# Patient Record
Sex: MALE | Race: WHITE | NOT HISPANIC OR LATINO | Employment: OTHER | URBAN - NONMETROPOLITAN AREA
[De-identification: names, ages, dates, MRNs, and addresses within clinical notes are randomized per-mention and may not be internally consistent; named-entity substitution may affect disease eponyms.]

---

## 2023-11-14 ENCOUNTER — OFFICE VISIT (OUTPATIENT)
Dept: FAMILY MEDICINE CLINIC | Facility: CLINIC | Age: 63
End: 2023-11-14
Payer: COMMERCIAL

## 2023-11-14 VITALS
OXYGEN SATURATION: 98 % | HEART RATE: 55 BPM | WEIGHT: 157.4 LBS | BODY MASS INDEX: 23.31 KG/M2 | HEIGHT: 69 IN | SYSTOLIC BLOOD PRESSURE: 120 MMHG | DIASTOLIC BLOOD PRESSURE: 82 MMHG

## 2023-11-14 DIAGNOSIS — Z13.29 SCREENING FOR THYROID DISORDER: ICD-10-CM

## 2023-11-14 DIAGNOSIS — I10 PRIMARY HYPERTENSION: ICD-10-CM

## 2023-11-14 DIAGNOSIS — Z11.59 NEED FOR HEPATITIS C SCREENING TEST: ICD-10-CM

## 2023-11-14 DIAGNOSIS — F33.9 RECURRENT MAJOR DEPRESSIVE DISORDER, REMISSION STATUS UNSPECIFIED: ICD-10-CM

## 2023-11-14 DIAGNOSIS — N18.2 CHRONIC KIDNEY DISEASE (CKD) STAGE G2/A1, MILDLY DECREASED GLOMERULAR FILTRATION RATE (GFR) BETWEEN 60-89 ML/MIN/1.73 SQUARE METER AND ALBUMINURIA CREATININE RATIO LESS THAN 30 MG/G: ICD-10-CM

## 2023-11-14 DIAGNOSIS — Z13.1 SCREENING FOR DIABETES MELLITUS: ICD-10-CM

## 2023-11-14 DIAGNOSIS — E78.2 MIXED HYPERLIPIDEMIA: Primary | ICD-10-CM

## 2023-11-14 DIAGNOSIS — F41.9 ANXIETY: ICD-10-CM

## 2023-11-14 DIAGNOSIS — Z12.5 PROSTATE CANCER SCREENING: ICD-10-CM

## 2023-11-14 PROBLEM — F32.A DEPRESSION: Status: ACTIVE | Noted: 2023-11-14

## 2023-11-14 PROBLEM — E78.5 HYPERLIPIDEMIA: Status: ACTIVE | Noted: 2023-11-14

## 2023-11-14 PROBLEM — M10.9 GOUT: Status: ACTIVE | Noted: 2023-11-14

## 2023-11-14 PROBLEM — I48.0 PAROXYSMAL ATRIAL FIBRILLATION: Status: ACTIVE | Noted: 2023-11-14

## 2023-11-14 PROBLEM — K21.9 GERD (GASTROESOPHAGEAL REFLUX DISEASE): Status: ACTIVE | Noted: 2023-11-14

## 2023-11-14 PROCEDURE — 36415 COLL VENOUS BLD VENIPUNCTURE: CPT | Performed by: PHYSICIAN ASSISTANT

## 2023-11-14 RX ORDER — ATORVASTATIN CALCIUM 40 MG/1
40 TABLET, FILM COATED ORAL DAILY
COMMUNITY
Start: 2023-10-16

## 2023-11-14 RX ORDER — ISOSORBIDE MONONITRATE 30 MG/1
30 TABLET, EXTENDED RELEASE ORAL EVERY MORNING
COMMUNITY
Start: 2023-08-24

## 2023-11-14 RX ORDER — RANOLAZINE 500 MG/1
500 TABLET, EXTENDED RELEASE ORAL 2 TIMES DAILY
COMMUNITY

## 2023-11-14 RX ORDER — LOSARTAN POTASSIUM 25 MG/1
25 TABLET ORAL DAILY
COMMUNITY
Start: 2023-11-07

## 2023-11-14 RX ORDER — CITALOPRAM 20 MG/1
20 TABLET ORAL DAILY
COMMUNITY
Start: 2023-09-12 | End: 2023-12-14 | Stop reason: SDUPTHER

## 2023-11-14 RX ORDER — RIVAROXABAN 20 MG/1
20 TABLET, FILM COATED ORAL
COMMUNITY
Start: 2023-08-25

## 2023-11-14 NOTE — PROGRESS NOTES
New Patient Office Visit      Date: 2023   Patient Name: Mayito Saxena  : 1960   MRN: [id number]     Chief Complaint:    Chief Complaint   Patient presents with    Establish Care       History of Present Illness: Mayito Saxena is a 63 y.o. male who is here today to establish care.  Denies any acute complaints at this time.  He states that he has been doing well on current therapy.  He states that he sees his cardiologist at discharge, Dr. Garcia, regularly.    Subjective      Review of Systems:   Review of Systems    Past Medical History:   Past Medical History:   Diagnosis Date    Allergic 1976    Dairy products, beans, corn, nuts.  Basically clog up my nasal passages and can affect my digestive tract    Anxiety 2023    Prescribed citalopram    Cataract     I have cataracts beginning to form, but ophthalmologist at last checkup in  says they’re progressing slowing.    Chronic kidney disease (CKD) stage G2/A1, mildly decreased glomerular filtration rate (GFR) between 60-89 mL/min/1.73 square meter and albuminuria creatinine ratio less than 30 mg/g 2023    Coronary artery disease     6 stents since     Depression 2023    Prescribed citalopram    GERD (gastroesophageal reflux disease) 2023    Gout 2023    HL (hearing loss)     Hearing aids    Hyperlipidemia 2023    Hypertension 2023    Kidney stone     Myocardial infarction     2 heart attacks previously    Pneumonia     Numerous times over my lifetime       Past Surgical History:   Past Surgical History:   Procedure Laterality Date    CARDIAC CATHETERIZATION      Numerous times since     COLONOSCOPY  2011    CORONARY STENT PLACEMENT      EYE SURGERY      Lasik    KIDNEY STONE SURGERY      TONSILLECTOMY      VASECTOMY         Family History:   Family History   Problem Relation Age of Onset    Diabetes Mother     Heart disease Mother     Hyperlipidemia Mother     Miscarriages /  "Stillbirths Mother     Vision loss Mother     Heart disease Father     Heart disease Maternal Grandfather     Hyperlipidemia Maternal Grandfather     Heart disease Maternal Grandmother     Hyperlipidemia Maternal Grandmother     Mental illness Maternal Grandmother     Diabetes Maternal Aunt     Kidney disease Maternal Aunt        Social History:   Social History     Socioeconomic History    Marital status:    Tobacco Use    Smoking status: Never    Smokeless tobacco: Never    Tobacco comments:     Willing to learn if it keeps me alive longer!   Vaping Use    Vaping Use: Never used   Substance and Sexual Activity    Alcohol use: Not Currently     Comment: Only drink beer…in [place], [place], [place] or the [place]    Drug use: Never    Sexual activity: Not Currently     Partners: Female     Birth control/protection: Vasectomy       Medications:     Current Outpatient Medications:     atorvastatin (LIPITOR) 40 MG tablet, Take 1 tablet by mouth Daily., Disp: , Rfl:     isosorbide mononitrate (IMDUR) 30 MG 24 hr tablet, Take 1 tablet by mouth Every Morning., Disp: , Rfl:     losartan (COZAAR) 25 MG tablet, Take 1 tablet by mouth Daily., Disp: , Rfl:     metoprolol tartrate (LOPRESSOR) 25 MG tablet, Take 1 tablet by mouth 2 (Two) Times a Day., Disp: , Rfl:     ranolazine (RANEXA) 500 MG 12 hr tablet, Take 1 tablet by mouth 2 (Two) Times a Day., Disp: , Rfl:     Xarelto 20 MG tablet, Take 1 tablet by mouth Daily With Dinner., Disp: , Rfl:     citalopram (CeleXA) 20 MG tablet, Take 1 tablet by mouth Daily., Disp: 90 tablet, Rfl: 0    Allergies:   Allergies   Allergen Reactions    Lisinopril Cough       Objective     Physical Exam:  Vital Signs:   Vitals:    11/14/23 1320   BP: 120/82   BP Location: Right arm   Patient Position: Sitting   Cuff Size: Adult   Pulse: 55   SpO2: 98%   Weight: 71.4 kg (157 lb 6.4 oz)   Height: 175.3 cm (69\")     Body mass index is 23.24 kg/m².  BMI is within normal parameters. No other " follow-up for BMI required.       Physical Exam  Vitals and nursing note reviewed.   Constitutional:       General: He is not in acute distress.     Appearance: Normal appearance. He is not ill-appearing.   HENT:      Head: Normocephalic and atraumatic.   Cardiovascular:      Rate and Rhythm: Normal rate and regular rhythm.      Pulses: Normal pulses.      Heart sounds: Normal heart sounds.   Pulmonary:      Effort: Pulmonary effort is normal. No respiratory distress.      Breath sounds: Normal breath sounds.   Musculoskeletal:      Right lower leg: No edema.      Left lower leg: No edema.   Skin:     General: Skin is warm and dry.   Neurological:      General: No focal deficit present.      Mental Status: He is alert and oriented to person, place, and time.      Motor: No weakness.      Coordination: Coordination normal.   Psychiatric:         Mood and Affect: Mood normal.         Behavior: Behavior normal.       Results:   PHQ-9 Total Score: 0        Assessment / Plan      Assessment/Plan:   Diagnoses and all orders for this visit:    1. Mixed hyperlipidemia (Primary)  Assessment & Plan:  Hyperlipidemia is chronic per patient history.  He will continue current medications, and we will check level on today's labs.    Orders:  -     Lipid Panel; Future  -     Lipid Panel    2. Primary hypertension  Assessment & Plan:  Hypertension is chronic and stable on current therapy.  Medication will be refilled when time.    Orders:  -     CBC Auto Differential; Future  -     Comprehensive Metabolic Panel; Future  -     CBC Auto Differential  -     Comprehensive Metabolic Panel    3. Recurrent major depressive disorder, remission status unspecified  Assessment & Plan:  Patient's depression is recurrent and is stable on current therapy without psychosis. Their depression is currently in partial remission and the condition is improving with treatment. This will be reassessed at the next regular appointment. F/U as  described:patient will continue current medication therapy.      4. Anxiety  Assessment & Plan:  Anxiety is chronic and stable on current medication.  He will continue as prescribed.      5. Chronic kidney disease (CKD) stage G2/A1, mildly decreased glomerular filtration rate (GFR) between 60-89 mL/min/1.73 square meter and albuminuria creatinine ratio less than 30 mg/g  Assessment & Plan:  Renal condition is chronic.  We will continue to monitor.      6. Need for hepatitis C screening test  -     HCV Antibody Rfx To Qnt PCR; Future  -     HCV Antibody Rfx To Qnt PCR  -     Interpretation:    7. Prostate cancer screening  -     PSA Screen; Future  -     PSA Screen    8. Screening for thyroid disorder  -     Thyroid Cascade Profile; Future  -     Thyroid Weston Profile    9. Screening for diabetes mellitus  -     Hemoglobin A1c; Future  -     Hemoglobin A1c         Follow Up:   Return in about 3 months (around 2/14/2024) for annual physical when due or sooner PRN.    Katlin Cruz PA-C  [facility] Internal Medicine [facility]

## 2023-11-15 LAB
ALBUMIN SERPL-MCNC: 5.1 G/DL (ref 3.9–4.9)
ALBUMIN/GLOB SERPL: 2.7 {RATIO} (ref 1.2–2.2)
ALP SERPL-CCNC: 92 IU/L (ref 44–121)
ALT SERPL-CCNC: 32 IU/L (ref 0–44)
AST SERPL-CCNC: 34 IU/L (ref 0–40)
BASOPHILS # BLD AUTO: 0 X10E3/UL (ref 0–0.2)
BASOPHILS NFR BLD AUTO: 0 %
BILIRUB SERPL-MCNC: 1.3 MG/DL (ref 0–1.2)
BUN SERPL-MCNC: 17 MG/DL (ref 8–27)
BUN/CREAT SERPL: 11 (ref 10–24)
CALCIUM SERPL-MCNC: 10.1 MG/DL (ref 8.6–10.2)
CHLORIDE SERPL-SCNC: 103 MMOL/L (ref 96–106)
CHOLEST SERPL-MCNC: 118 MG/DL (ref 100–199)
CO2 SERPL-SCNC: 26 MMOL/L (ref 20–29)
CREAT SERPL-MCNC: 1.48 MG/DL (ref 0.76–1.27)
EGFRCR SERPLBLD CKD-EPI 2021: 53 ML/MIN/1.73
EOSINOPHIL # BLD AUTO: 0.1 X10E3/UL (ref 0–0.4)
EOSINOPHIL NFR BLD AUTO: 1 %
ERYTHROCYTE [DISTWIDTH] IN BLOOD BY AUTOMATED COUNT: 11.9 % (ref 11.6–15.4)
GLOBULIN SER CALC-MCNC: 1.9 G/DL (ref 1.5–4.5)
GLUCOSE SERPL-MCNC: 94 MG/DL (ref 70–99)
HBA1C MFR BLD: 5 % (ref 4.8–5.6)
HCT VFR BLD AUTO: 44.7 % (ref 37.5–51)
HCV AB SERPL QL IA: NORMAL
HCV IGG SERPL QL IA: NON REACTIVE
HDLC SERPL-MCNC: 45 MG/DL
HGB BLD-MCNC: 15.4 G/DL (ref 13–17.7)
IMM GRANULOCYTES # BLD AUTO: 0 X10E3/UL (ref 0–0.1)
IMM GRANULOCYTES NFR BLD AUTO: 0 %
LDLC SERPL CALC-MCNC: 54 MG/DL (ref 0–99)
LYMPHOCYTES # BLD AUTO: 1.2 X10E3/UL (ref 0.7–3.1)
LYMPHOCYTES NFR BLD AUTO: 22 %
MCH RBC QN AUTO: 31.6 PG (ref 26.6–33)
MCHC RBC AUTO-ENTMCNC: 34.5 G/DL (ref 31.5–35.7)
MCV RBC AUTO: 92 FL (ref 79–97)
MONOCYTES # BLD AUTO: 0.5 X10E3/UL (ref 0.1–0.9)
MONOCYTES NFR BLD AUTO: 9 %
NEUTROPHILS # BLD AUTO: 3.8 X10E3/UL (ref 1.4–7)
NEUTROPHILS NFR BLD AUTO: 68 %
PLATELET # BLD AUTO: 171 X10E3/UL (ref 150–450)
POTASSIUM SERPL-SCNC: 4.4 MMOL/L (ref 3.5–5.2)
PROT SERPL-MCNC: 7 G/DL (ref 6–8.5)
PSA SERPL-MCNC: 1.4 NG/ML (ref 0–4)
RBC # BLD AUTO: 4.87 X10E6/UL (ref 4.14–5.8)
SODIUM SERPL-SCNC: 143 MMOL/L (ref 134–144)
TRIGL SERPL-MCNC: 102 MG/DL (ref 0–149)
TSH SERPL DL<=0.005 MIU/L-ACNC: 2.57 UIU/ML (ref 0.45–4.5)
VLDLC SERPL CALC-MCNC: 19 MG/DL (ref 5–40)
WBC # BLD AUTO: 5.6 X10E3/UL (ref 3.4–10.8)

## 2023-12-02 DIAGNOSIS — R79.9 ABNORMAL BLOOD CHEMISTRY: Primary | ICD-10-CM

## 2023-12-14 RX ORDER — CITALOPRAM 20 MG/1
20 TABLET ORAL DAILY
Qty: 90 TABLET | Refills: 0 | Status: SHIPPED | OUTPATIENT
Start: 2023-12-14

## 2023-12-28 NOTE — ASSESSMENT & PLAN NOTE
Patient's depression is recurrent and is stable on current therapy without psychosis. Their depression is currently in partial remission and the condition is improving with treatment. This will be reassessed at the next regular appointment. F/U as described:patient will continue current medication therapy.

## 2023-12-28 NOTE — ASSESSMENT & PLAN NOTE
Hyperlipidemia is chronic per patient history.  He will continue current medications, and we will check level on today's labs.

## 2024-02-15 ENCOUNTER — OFFICE VISIT (OUTPATIENT)
Dept: FAMILY MEDICINE CLINIC | Facility: CLINIC | Age: 64
End: 2024-02-15
Payer: COMMERCIAL

## 2024-02-15 VITALS
OXYGEN SATURATION: 100 % | WEIGHT: 169 LBS | DIASTOLIC BLOOD PRESSURE: 80 MMHG | HEIGHT: 69 IN | SYSTOLIC BLOOD PRESSURE: 120 MMHG | HEART RATE: 62 BPM | BODY MASS INDEX: 25.03 KG/M2

## 2024-02-15 DIAGNOSIS — F41.9 ANXIETY: ICD-10-CM

## 2024-02-15 DIAGNOSIS — N18.2 CHRONIC KIDNEY DISEASE (CKD) STAGE G2/A1, MILDLY DECREASED GLOMERULAR FILTRATION RATE (GFR) BETWEEN 60-89 ML/MIN/1.73 SQUARE METER AND ALBUMINURIA CREATININE RATIO LESS THAN 30 MG/G: ICD-10-CM

## 2024-02-15 DIAGNOSIS — Z00.00 GENERAL MEDICAL EXAM: Primary | ICD-10-CM

## 2024-02-15 DIAGNOSIS — Z12.11 ENCOUNTER FOR SCREENING FOR MALIGNANT NEOPLASM OF COLON: ICD-10-CM

## 2024-02-15 RX ORDER — CITALOPRAM 20 MG/1
20 TABLET ORAL DAILY
Qty: 90 TABLET | Refills: 1 | Status: SHIPPED | OUTPATIENT
Start: 2024-02-15

## 2024-02-20 ENCOUNTER — TELEPHONE (OUTPATIENT)
Dept: GASTROENTEROLOGY | Facility: CLINIC | Age: 64
End: 2024-02-20
Payer: COMMERCIAL

## 2024-02-20 NOTE — TELEPHONE ENCOUNTER
CARDIAC CLEARANCE FAXED TO DR ERIN GARG @ [phone].    PT MAY HAVE TO BE RESCHEDULED DUE TO QUICKNESS OF GETTING CLEARANCE IS SHORT. CHUCK IS AWARE.

## 2024-02-21 NOTE — TELEPHONE ENCOUNTER
SPOKE WITH PT TO LET HIM KNOW THAT HE NEEDS TO HOLD HIS BRILLANTA X5 DAYS PRIOR TO HIS PROCEDURE WITH DR. LOZA. PT VERBALIZED UNDERSTANDING.

## 2024-02-22 RX ORDER — SODIUM, POTASSIUM,MAG SULFATES 17.5-3.13G
2 SOLUTION, RECONSTITUTED, ORAL ORAL TAKE AS DIRECTED
Qty: 354 ML | Refills: 0 | Status: SHIPPED | OUTPATIENT
Start: 2024-02-22

## 2024-02-29 ENCOUNTER — OUTSIDE FACILITY SERVICE (OUTPATIENT)
Dept: GASTROENTEROLOGY | Facility: CLINIC | Age: 64
End: 2024-02-29
Payer: COMMERCIAL

## 2024-02-29 PROCEDURE — 45385 COLONOSCOPY W/LESION REMOVAL: CPT | Performed by: INTERNAL MEDICINE

## 2024-02-29 PROCEDURE — 88305 TISSUE EXAM BY PATHOLOGIST: CPT | Performed by: INTERNAL MEDICINE

## 2024-03-01 ENCOUNTER — LAB REQUISITION (OUTPATIENT)
Dept: LAB | Facility: HOSPITAL | Age: 64
End: 2024-03-01
Payer: COMMERCIAL

## 2024-03-01 DIAGNOSIS — D12.2 BENIGN NEOPLASM OF ASCENDING COLON: ICD-10-CM

## 2024-03-01 DIAGNOSIS — K64.8 OTHER HEMORRHOIDS: ICD-10-CM

## 2024-03-01 DIAGNOSIS — Z12.11 ENCOUNTER FOR SCREENING FOR MALIGNANT NEOPLASM OF COLON: ICD-10-CM

## 2024-03-01 DIAGNOSIS — D12.8 BENIGN NEOPLASM OF RECTUM: ICD-10-CM

## 2024-03-01 DIAGNOSIS — D12.3 BENIGN NEOPLASM OF TRANSVERSE COLON: ICD-10-CM

## 2024-03-04 LAB — REF LAB TEST METHOD: NORMAL

## 2024-06-13 ENCOUNTER — PATIENT MESSAGE (OUTPATIENT)
Dept: FAMILY MEDICINE CLINIC | Facility: CLINIC | Age: 64
End: 2024-06-13
Payer: COMMERCIAL

## 2024-06-20 DIAGNOSIS — Z98.61 CAD S/P PERCUTANEOUS CORONARY ANGIOPLASTY: ICD-10-CM

## 2024-06-20 DIAGNOSIS — I25.10 CAD S/P PERCUTANEOUS CORONARY ANGIOPLASTY: ICD-10-CM

## 2024-06-20 DIAGNOSIS — I48.0 PAROXYSMAL ATRIAL FIBRILLATION: Primary | ICD-10-CM

## 2024-06-24 ENCOUNTER — TELEPHONE (OUTPATIENT)
Dept: CARDIOLOGY | Facility: CLINIC | Age: 64
End: 2024-06-24
Payer: COMMERCIAL

## 2024-07-30 ENCOUNTER — OFFICE VISIT (OUTPATIENT)
Dept: CARDIOLOGY | Facility: CLINIC | Age: 64
End: 2024-07-30
Payer: COMMERCIAL

## 2024-07-30 VITALS
OXYGEN SATURATION: 98 % | HEART RATE: 52 BPM | RESPIRATION RATE: 18 BRPM | DIASTOLIC BLOOD PRESSURE: 72 MMHG | HEIGHT: 69 IN | WEIGHT: 159.2 LBS | SYSTOLIC BLOOD PRESSURE: 130 MMHG | BODY MASS INDEX: 23.58 KG/M2

## 2024-07-30 DIAGNOSIS — I25.119 CORONARY ARTERY DISEASE INVOLVING NATIVE CORONARY ARTERY OF NATIVE HEART WITH ANGINA PECTORIS: Primary | ICD-10-CM

## 2024-07-30 DIAGNOSIS — E78.2 MIXED HYPERLIPIDEMIA: ICD-10-CM

## 2024-07-30 DIAGNOSIS — R06.09 DYSPNEA ON EXERTION: ICD-10-CM

## 2024-07-30 DIAGNOSIS — I48.0 PAROXYSMAL ATRIAL FIBRILLATION: ICD-10-CM

## 2024-07-30 PROCEDURE — 93000 ELECTROCARDIOGRAM COMPLETE: CPT | Performed by: INTERNAL MEDICINE

## 2024-07-30 PROCEDURE — 99204 OFFICE O/P NEW MOD 45 MIN: CPT | Performed by: INTERNAL MEDICINE

## 2024-07-30 NOTE — ASSESSMENT & PLAN NOTE
Lipid abnormalities are improving with treatment    Plan:  Continue same medication/s without change.      Discussed medication dosage, use, side effects, and goals of treatment in detail.    Counseled patient on lifestyle modifications to help control hyperlipidemia.     Patient Treatment Goals:   LDL goal is less than 70  Labs today  Followup in 6 months.

## 2024-07-30 NOTE — ASSESSMENT & PLAN NOTE
Likely related to previous myocardial infarction.  Possibly lung disease component in the setting of dyspnea on exertion in hot and humid weather.  Patient biked 29 miles yesterday, so holding off additional testing for now.  Consider repeat echocardiogram and stress testing next year/3 years interval.

## 2024-07-30 NOTE — PROGRESS NOTES
Venipuncture Blood Specimen Collection  Venipuncture performed in clinic by Yumiko Lemus RN with good hemostasis. Patient tolerated the procedure well without complications.   07/30/24   Yumiko Lemus RN

## 2024-07-30 NOTE — ASSESSMENT & PLAN NOTE
Paroxysmal events.  Somewhat bothering patient.  Does not wanted to be treated as for now.  On Xarelto for anticoagulation.  On metoprolol 25 mg twice daily for rate control.  Sinus bradycardia on EKG related to comparative/athletic lifestyle.  In the setting of CAD, dofetilide versus ablation would be the best option moving forward if patient decides to proceed with rhythm control.

## 2024-07-30 NOTE — ASSESSMENT & PLAN NOTE
CAD seen prior 2 MIs and 6 stents.  Last cardiac cath and RCA stenting in 2022.  CCS 1 angina.  Coronary artery disease is improving with treatment.  BP and heart rate to goal at home.  LDL to target.  Significant family history of early CAD.  Goal blood pressure less than 130/80 in the setting of CKD.  Continue current treatment regimen. Continue current medications. Dietary sodium restriction. Weight loss. Regular aerobic exercise.  Cardiac status will be reassessed in 6 months.

## 2024-07-30 NOTE — PROGRESS NOTES
[id number] [place]  [facility] CARDIOLOGY  [address] [place] [address]  Dept: [phone]  Dept Fax: [phone]    Date: 07/30/2024  Patient: Mayito Saxena  YOB: 1960    New Patient Office Note    Consult Reason:  Mr. Mayito Saxena is a 64 y.o. male who presents to the clinic to establish care, seen for Atrial Fibrillation and Coronary Artery Disease.   Patient doing well with no complaints.  Patient denies angina, orthopnea, PND, palpitations, lightheadedness, syncope or medications side-effects.  Patient short of breath biking uphill on hot and humid weather, otherwise no limitation to exercise.  Biked 29 miles yesterday with no concerns, only regret that he biked with the slow group.    The following portions of the patient's history were reviewed and updated as appropriate: allergies, current medications, past family history, past medical history, past social history, past surgical history, and problem list.    Medications:   Allergies   Allergen Reactions    Lisinopril Cough      Current Outpatient Medications   Medication Instructions    atorvastatin (LIPITOR) 40 mg, Oral, Daily    citalopram (CELEXA) 20 mg, Oral, Daily    isosorbide mononitrate (IMDUR) 30 mg, Oral, Every Morning    losartan (COZAAR) 50 mg, Oral, Daily    metoprolol tartrate (LOPRESSOR) 25 mg, Oral, 2 Times Daily    ranolazine (RANEXA) 500 mg, Oral, 2 Times Daily    sodium-potassium-magnesium sulfates (Suprep Bowel Prep Kit) 17.5-3.13-1.6 GM/177ML solution oral solution 2 bottles, Oral, Take As Directed    ticagrelor (BRILINTA) 90 mg, Oral, 2 Times Daily    Xarelto 20 mg, Oral, Daily With Dinner       Subjective  Past Medical History:   Diagnosis Date    Allergic 1976    Dairy products, beans, corn, nuts.  Basically clog up my nasal passages and can affect my digestive tract    Anxiety 11/14/2023    Prescribed citalopram    Cataract 2021    I have cataracts beginning to form, but  ophthalmologist at last checkup in July, 2023 says they’re progressing slowing.    Chronic kidney disease (CKD) stage G2/A1, mildly decreased glomerular filtration rate (GFR) between 60-89 mL/min/1.73 square meter and albuminuria creatinine ratio less than 30 mg/g 11/14/2023    Coronary artery disease     6 stents since 2006    Depression 11/14/2023    Prescribed citalopram    GERD (gastroesophageal reflux disease) 11/14/2023    Gout 11/14/2023    HL (hearing loss)     Hearing aids    Hyperlipidemia 11/14/2023    Hypertension 11/14/2023    Kidney stone     Myocardial infarction     2 heart attacks previously    Pneumonia     Numerous times over my lifetime    Vertigo        Past Surgical History:   Procedure Laterality Date    CARDIAC CATHETERIZATION      Numerous times since 2006    COLONOSCOPY  2011    CORONARY STENT PLACEMENT      EYE SURGERY  2000    Lasik    KIDNEY STONE SURGERY      TONSILLECTOMY      VASECTOMY         Family History   Problem Relation Age of Onset    Diabetes Mother     Heart disease Mother     Hyperlipidemia Mother     Miscarriages / Stillbirths Mother     Vision loss Mother     Heart disease Father     Heart disease Maternal Grandfather     Hyperlipidemia Maternal Grandfather     Heart disease Maternal Grandmother     Hyperlipidemia Maternal Grandmother     Mental illness Maternal Grandmother     Diabetes Maternal Aunt     Kidney disease Maternal Aunt         Social History     Socioeconomic History    Marital status:    Tobacco Use    Smoking status: Never    Smokeless tobacco: Never    Tobacco comments:     Willing to learn if it keeps me alive longer!   Vaping Use    Vaping status: Never Used   Substance and Sexual Activity    Alcohol use: Not Currently     Comment: Only drink beer…in [place], [place], [place] or the [place]    Drug use: Never    Sexual activity: Not Currently     Partners: Female     Birth control/protection: Vasectomy       Objective  Vitals:    07/30/24 1001  "  BP: 130/72   BP Location: Right arm   Patient Position: Sitting   Pulse: 52   Resp: 18   SpO2: 98%   Weight: 72.2 kg (159 lb 3.2 oz)   Height: 175.3 cm (69\")   PainSc: 0-No pain     Vitals:    07/30/24 1001   BP: 130/72   BP Location: Right arm   Patient Position: Sitting   Pulse: 52   Resp: 18   SpO2: 98%   Weight: 72.2 kg (159 lb 3.2 oz)   Height: 175.3 cm (69\")        Physical Exam  Constitutional:       Appearance: Healthy appearance. Not in distress.   Eyes:      Pupils: Pupils are equal, round, and reactive to light.   HENT:    Mouth/Throat:      Mouth: Mucous membranes are moist.   Neck:      Vascular: No carotid bruit, hepatojugular reflux, JVD or JVR. JVD normal.   Pulmonary:      Effort: Pulmonary effort is normal.      Breath sounds: Normal breath sounds. No wheezing. No rhonchi. No rales.   Chest:      Chest wall: Not tender to palpatation.   Cardiovascular:      PMI at left midclavicular line. Normal rate. Regular rhythm. Normal S1 with normal intensity. Normal S2 with normal intensity.       Murmurs: There is no murmur.      No gallop.  No click. No rub.   Pulses:     Intact distal pulses.      Carotid: 4+ bilaterally.     Radial: 4+ bilaterally.     Popliteal: 4+ bilaterally.     Dorsalis pedis: 4+ bilaterally.  Edema:     Peripheral edema absent.   Abdominal:      General: There is no abdominal bruit.   Skin:     General: Skin is warm.   Neurological:      Mental Status: Alert and oriented to person, place and time.              Labs:  Lab Results   Component Value Date     11/14/2023    K 4.4 11/14/2023     11/14/2023    CO2 26 11/14/2023    MG 2.2 02/08/2022    BUN 17 11/14/2023    CREATININE 1.48 (H) 11/14/2023    CALCIUM 10.1 11/14/2023    BILITOT 1.3 (H) 11/14/2023    ALKPHOS 92 11/14/2023    ALT 32 11/14/2023    AST 34 11/14/2023    GLUCOSE 94 11/14/2023    ALBUMIN 5.1 (H) 11/14/2023     Lab Results   Component Value Date    WBC 5.6 11/14/2023    HGB 15.4 11/14/2023    HCT 44.7 " 11/14/2023     11/14/2023     Lab Results   Component Value Date    INR 1.0 08/30/2021    .6 (C) 02/08/2022    PTT 28.2 08/30/2021     Lab Results   Component Value Date    BNP 33 02/08/2022    BNP 86 08/30/2021     Lab Results   Component Value Date    BNP 33 02/08/2022       Lab Results   Component Value Date    CHLPL 118 11/14/2023    TRIG 102 11/14/2023    HDL 45 11/14/2023    LDL 54 11/14/2023     Lab Results   Component Value Date    TSH 2.570 11/14/2023       The ASCVD Risk score (Christian DK, et al., 2019) failed to calculate for the following reasons:    The valid total cholesterol range is 130 to 320 mg/dL     [facility]:    ECG 12 Lead    Date/Time: 7/30/2024 10:13 AM  Performed by: Binh Abreu MD    Authorized by: Binh Abreu MD  Comparison: not compared with previous ECG   Previous ECG: no previous ECG available  Rhythm: sinus rhythm  Conduction: 1st degree AV block  Q waves: II, III, aVF, V5 and V6    Comments: Sinus rhythm with first-degree AV block, inferior and anterolateral MI, old, T wave abnormality possible anterior ischemia        CXR: No results found for this or any previous visit.     ECHO/MUGA: No results found for this or any previous visit.     STRESS TESTS: No results found for this or any previous visit.     CARDIAC CATH: No results found for this or any previous visit.     DEVICES: No valid procedures specified.   HOLTER: No results found for this or any previous visit.     CT/MRI:  No results found for this or any previous visit.    VASCULAR: No valid procedures specified.     Assessment and Plan  Diagnoses and all orders for this visit:    1. Coronary artery disease involving native coronary artery of native heart with angina pectoris (Primary)  Assessment & Plan:  CAD seen prior 2 MIs and 6 stents.  Last cardiac cath and RCA stenting in 2022.  CCS 1 angina.  Coronary artery disease is improving with treatment.  BP and heart rate to goal at home.  LDL to target.   Significant family history of early CAD.  Goal blood pressure less than 130/80 in the setting of CKD.  Continue current treatment regimen. Continue current medications. Dietary sodium restriction. Weight loss. Regular aerobic exercise.  Cardiac status will be reassessed in 6 months.    Orders:  -     Magnesium  -     Comprehensive Metabolic Panel  -     Lipid Panel  -     CBC & Differential    2. Paroxysmal atrial fibrillation  Assessment & Plan:  Paroxysmal events.  Somewhat bothering patient.  Does not wanted to be treated as for now.  On Xarelto for anticoagulation.  On metoprolol 25 mg twice daily for rate control.  Sinus bradycardia on EKG related to comparative/athletic lifestyle.  In the setting of CAD, dofetilide versus ablation would be the best option moving forward if patient decides to proceed with rhythm control.      3. Mixed hyperlipidemia  Assessment & Plan:   Lipid abnormalities are improving with treatment    Plan:  Continue same medication/s without change.      Discussed medication dosage, use, side effects, and goals of treatment in detail.    Counseled patient on lifestyle modifications to help control hyperlipidemia.     Patient Treatment Goals:   LDL goal is less than 70  Labs today  Followup in 6 months.      4. Dyspnea on exertion  Assessment & Plan:  Likely related to previous myocardial infarction.  Possibly lung disease component in the setting of dyspnea on exertion in hot and humid weather.  Patient biked 29 miles yesterday, so holding off additional testing for now.  Consider repeat echocardiogram and stress testing next year/3 years interval.    Orders:  -     proBNP    Other orders  -     ECG 12 Lead         Return in about 6 months (around 1/30/2025) for Follow-up with Dr Abreu.    There are no Patient Instructions on file for this visit.    Binh Abreu MD

## 2024-07-31 LAB
ALBUMIN SERPL-MCNC: 4.8 G/DL (ref 3.9–4.9)
ALP SERPL-CCNC: 99 IU/L (ref 44–121)
ALT SERPL-CCNC: 25 IU/L (ref 0–44)
AST SERPL-CCNC: 28 IU/L (ref 0–40)
BASOPHILS # BLD AUTO: 0 X10E3/UL (ref 0–0.2)
BASOPHILS NFR BLD AUTO: 1 %
BILIRUB SERPL-MCNC: 1.2 MG/DL (ref 0–1.2)
BUN SERPL-MCNC: 23 MG/DL (ref 8–27)
BUN/CREAT SERPL: 14 (ref 10–24)
CALCIUM SERPL-MCNC: 10 MG/DL (ref 8.6–10.2)
CHLORIDE SERPL-SCNC: 102 MMOL/L (ref 96–106)
CHOLEST SERPL-MCNC: 109 MG/DL (ref 100–199)
CO2 SERPL-SCNC: 25 MMOL/L (ref 20–29)
CREAT SERPL-MCNC: 1.65 MG/DL (ref 0.76–1.27)
EGFRCR SERPLBLD CKD-EPI 2021: 46 ML/MIN/1.73
EOSINOPHIL # BLD AUTO: 0.1 X10E3/UL (ref 0–0.4)
EOSINOPHIL NFR BLD AUTO: 2 %
ERYTHROCYTE [DISTWIDTH] IN BLOOD BY AUTOMATED COUNT: 13 % (ref 11.6–15.4)
GLOBULIN SER CALC-MCNC: 2.3 G/DL (ref 1.5–4.5)
GLUCOSE SERPL-MCNC: 91 MG/DL (ref 70–99)
HCT VFR BLD AUTO: 39.9 % (ref 37.5–51)
HDLC SERPL-MCNC: 41 MG/DL
HGB BLD-MCNC: 13.9 G/DL (ref 13–17.7)
IMM GRANULOCYTES # BLD AUTO: 0 X10E3/UL (ref 0–0.1)
IMM GRANULOCYTES NFR BLD AUTO: 0 %
LDLC SERPL CALC-MCNC: 48 MG/DL (ref 0–99)
LYMPHOCYTES # BLD AUTO: 1.1 X10E3/UL (ref 0.7–3.1)
LYMPHOCYTES NFR BLD AUTO: 22 %
MAGNESIUM SERPL-MCNC: 2.2 MG/DL (ref 1.6–2.3)
MCH RBC QN AUTO: 32.6 PG (ref 26.6–33)
MCHC RBC AUTO-ENTMCNC: 34.8 G/DL (ref 31.5–35.7)
MCV RBC AUTO: 93 FL (ref 79–97)
MONOCYTES # BLD AUTO: 0.4 X10E3/UL (ref 0.1–0.9)
MONOCYTES NFR BLD AUTO: 9 %
NEUTROPHILS # BLD AUTO: 3.2 X10E3/UL (ref 1.4–7)
NEUTROPHILS NFR BLD AUTO: 66 %
NT-PROBNP SERPL-MCNC: 362 PG/ML (ref 0–210)
PLATELET # BLD AUTO: 159 X10E3/UL (ref 150–450)
POTASSIUM SERPL-SCNC: 4.5 MMOL/L (ref 3.5–5.2)
PROT SERPL-MCNC: 7.1 G/DL (ref 6–8.5)
RBC # BLD AUTO: 4.27 X10E6/UL (ref 4.14–5.8)
SODIUM SERPL-SCNC: 141 MMOL/L (ref 134–144)
TRIGL SERPL-MCNC: 107 MG/DL (ref 0–149)
VLDLC SERPL CALC-MCNC: 20 MG/DL (ref 5–40)
WBC # BLD AUTO: 4.8 X10E3/UL (ref 3.4–10.8)

## 2024-08-01 RX ORDER — ISOSORBIDE MONONITRATE 30 MG/1
60 TABLET, EXTENDED RELEASE ORAL EVERY MORNING
Status: SHIPPED | COMMUNITY
Start: 2024-08-01

## 2024-08-01 RX ORDER — LOSARTAN POTASSIUM 50 MG/1
25 TABLET ORAL DAILY
Status: SHIPPED | COMMUNITY
Start: 2024-08-01

## 2024-08-02 ENCOUNTER — TELEPHONE (OUTPATIENT)
Dept: CARDIOLOGY | Facility: CLINIC | Age: 64
End: 2024-08-02
Payer: COMMERCIAL

## 2024-08-02 DIAGNOSIS — I25.119 CORONARY ARTERY DISEASE INVOLVING NATIVE CORONARY ARTERY OF NATIVE HEART WITH ANGINA PECTORIS: ICD-10-CM

## 2024-08-02 DIAGNOSIS — N18.2 CHRONIC KIDNEY DISEASE (CKD) STAGE G2/A1, MILDLY DECREASED GLOMERULAR FILTRATION RATE (GFR) BETWEEN 60-89 ML/MIN/1.73 SQUARE METER AND ALBUMINURIA CREATININE RATIO LESS THAN 30 MG/G: Primary | ICD-10-CM

## 2024-08-02 NOTE — TELEPHONE ENCOUNTER
Called patient to notify of labs and discuss medication changes. No answer, left voicemail requesting call back

## 2024-08-02 NOTE — TELEPHONE ENCOUNTER
----- Message from Binh Abreu sent at 8/1/2024  4:49 PM EDT -----  Please inform patient that his Blood work was abnormal, showing mild worsening in his kidney function and mildly elevated heart test (possibly CHF and related to his kidney function, favoring the latter based on the visit the other day showing no volume overload).  Please ask patient to decrease losartan to 25 mg p.o. daily (cut the 50 mg tab by half) and increase isosorbide mononitrate 30 mg to 2 tabs daily equals 60 mg.  We need to repeat blood work (proBNP and BMP) in 1 month-please schedule.  Thank you!

## 2024-08-05 ENCOUNTER — TELEPHONE (OUTPATIENT)
Dept: CARDIOLOGY | Facility: CLINIC | Age: 64
End: 2024-08-05
Payer: COMMERCIAL

## 2024-08-05 ENCOUNTER — TELEPHONE (OUTPATIENT)
Dept: CARDIOLOGY | Facility: CLINIC | Age: 64
End: 2024-08-05

## 2024-08-06 ENCOUNTER — TELEPHONE (OUTPATIENT)
Dept: CARDIOLOGY | Facility: CLINIC | Age: 64
End: 2024-08-06
Payer: COMMERCIAL

## 2024-08-06 NOTE — TELEPHONE ENCOUNTER
Discussed with patient lab results and medication changes as ordered by Dr Abreu. Patient verbalized understanding. Patient to return for repeat labs 8/30/2024

## 2024-08-09 DIAGNOSIS — F41.9 ANXIETY: ICD-10-CM

## 2024-08-09 RX ORDER — CITALOPRAM 20 MG/1
20 TABLET ORAL DAILY
Qty: 90 TABLET | Refills: 1 | Status: SHIPPED | OUTPATIENT
Start: 2024-08-09

## 2024-08-14 ENCOUNTER — TELEPHONE (OUTPATIENT)
Dept: CARDIOLOGY | Facility: CLINIC | Age: 64
End: 2024-08-14
Payer: COMMERCIAL

## 2024-08-14 NOTE — TELEPHONE ENCOUNTER
PT IS NEEDING TO RESCHEDULE HIS NURSE VISIT     DIDN'T SEE WHAT IT WAS FOR AND PT DIDN'T KNOW EITHER    PLEASE ADVISE PT AND RESCHEDULE

## 2024-08-14 NOTE — TELEPHONE ENCOUNTER
Patient to have repeat blood work (proBNP and BMP) 1 month from previous labs (7/30/24).   Called patient to reschedule, no answer, voicemail not set up   HUB ok to relay and schedule

## 2024-08-15 ENCOUNTER — OFFICE VISIT (OUTPATIENT)
Dept: FAMILY MEDICINE CLINIC | Facility: CLINIC | Age: 64
End: 2024-08-15
Payer: COMMERCIAL

## 2024-08-15 VITALS
DIASTOLIC BLOOD PRESSURE: 68 MMHG | BODY MASS INDEX: 23.52 KG/M2 | WEIGHT: 158.8 LBS | HEART RATE: 64 BPM | OXYGEN SATURATION: 97 % | SYSTOLIC BLOOD PRESSURE: 112 MMHG | HEIGHT: 69 IN

## 2024-08-15 DIAGNOSIS — I10 PRIMARY HYPERTENSION: ICD-10-CM

## 2024-08-15 DIAGNOSIS — N18.31 STAGE 3A CHRONIC KIDNEY DISEASE: ICD-10-CM

## 2024-08-15 DIAGNOSIS — F41.9 ANXIETY: Primary | ICD-10-CM

## 2024-08-15 PROBLEM — N18.9 CKD (CHRONIC KIDNEY DISEASE): Status: ACTIVE | Noted: 2023-11-14

## 2024-08-15 PROCEDURE — 99214 OFFICE O/P EST MOD 30 MIN: CPT | Performed by: PHYSICIAN ASSISTANT

## 2024-08-15 RX ORDER — CITALOPRAM 20 MG/1
20 TABLET ORAL DAILY
Qty: 90 TABLET | Refills: 1 | Status: SHIPPED | OUTPATIENT
Start: 2024-08-15

## 2024-08-15 NOTE — PROGRESS NOTES
Office Note     Name: Mayito Saxena    : 1960     MRN: [id number]     Chief Complaint  Depression and Hypertension    Subjective     Mayito Saxena is a 64 y.o. male.  History of Present Illness  He presents for evaluation of depression, hypertension, and CKD, which are chronic per patient history.    He reports that his creatinine levels have been higher than usual recently. He had scheduled an appointment for blood work on the  but had to cancel due to travel plans.  He plans to repeat his levels as recommended by Dr. Abreu.    His losartan dosage was increased to 50 mg by Dr. Garcia, but was reduced back to 25 mg by Dr. Abreu. He also increased in his isosorbide dosage.     He continues to engage in cycling, although he finds it challenging in hot and humid weather. He completed a 10-mile run in [facility] in 2024, finishing only a minute and a half slower than his previous run 3 or 4 years ago.    He continues to tolerate his citalopram treatment well.      Review of Systems:   Review of Systems   All other systems reviewed and are negative.      Past Medical History:   Past Medical History:   Diagnosis Date    Allergic 1976    Dairy products, beans, corn, nuts.  Basically clog up my nasal passages and can affect my digestive tract    Anxiety 2023    Prescribed citalopram    Asthma     Breathing issues when working out in hot, humid conditions    Cataract     I have cataracts beginning to form, but ophthalmologist at last checkup in  says they’re progressing slowing.    CHF (congestive heart failure)     Ongoing    Chronic kidney disease (CKD) stage G2/A1, mildly decreased glomerular filtration rate (GFR) between 60-89 mL/min/1.73 square meter and albuminuria creatinine ratio less than 30 mg/g 2023    Coronary artery disease     6 stents since     Depression 2023    Prescribed citalopram    GERD (gastroesophageal reflux disease) 2023    Gout  11/14/2023    HL (hearing loss)     Hearing aids    Hyperlipidemia 11/14/2023    Hypertension 11/14/2023    Kidney stone     Myocardial infarction     2 heart attacks previously    Pneumonia     Numerous times over my lifetime    Vertigo        Past Surgical History:   Past Surgical History:   Procedure Laterality Date    CARDIAC CATHETERIZATION      Numerous times since 2006    COLONOSCOPY  2011    CORONARY STENT PLACEMENT      EYE SURGERY  2000    Lasik    KIDNEY STONE SURGERY      TONSILLECTOMY      VASECTOMY         Family History:   Family History   Problem Relation Age of Onset    Diabetes Mother     Heart disease Mother     Hyperlipidemia Mother     Miscarriages / Stillbirths Mother     Vision loss Mother     Heart disease Father     Heart disease Maternal Grandfather     Hyperlipidemia Maternal Grandfather     Heart disease Maternal Grandmother     Hyperlipidemia Maternal Grandmother     Mental illness Maternal Grandmother     Diabetes Maternal Aunt     Kidney disease Maternal Aunt        Social History:   Social History     Socioeconomic History    Marital status:    Tobacco Use    Smoking status: Never    Smokeless tobacco: Never    Tobacco comments:     Willing to learn if it keeps me alive longer!   Vaping Use    Vaping status: Never Used   Substance and Sexual Activity    Alcohol use: Not Currently     Comment: Only drink beer…in [place], [place], [place] or [facility]    Drug use: Never    Sexual activity: Not Currently     Partners: Female     Birth control/protection: Vasectomy       Immunizations:   Immunization History   Administered Date(s) Administered    COVID-19 (MODERNA) 1st,2nd,3rd Dose Monovalent 03/16/2021, 04/13/2021    COVID-19 (MODERNA) BIVALENT 12+YRS 02/07/2023    COVID-19 (MODERNA) Monovalent Original Booster 11/22/2021    COVID-19 F23 (PFIZER) 12YRS+ (COMIRNATY) 12/05/2023    Flu Vaccine Quad PF 6-35MO 10/13/2015, 10/27/2016, 11/07/2017, 11/08/2018    Flublok 18+yrs  "09/17/2021, 09/13/2022    Fluzone (or Fluarix & Flulaval for VFC) >6mos 10/13/2015, 10/27/2016, 11/07/2017, 11/08/2018, 09/03/2019, 09/24/2020, 08/28/2023    Influenza Injectable Mdck Pf Quad 09/03/2019, 08/28/2023    Influenza recombinant 09/03/2019, 09/28/2020    Pneumococcal Polysaccharide (PPSV23) 10/27/2016, 09/13/2022    Shingrix 10/22/2019, 01/07/2020    Tdap 06/19/2018        Medications:     Current Outpatient Medications:     atorvastatin (LIPITOR) 40 MG tablet, Take 1 tablet by mouth Daily., Disp: , Rfl:     citalopram (CeleXA) 20 MG tablet, Take 1 tablet by mouth Daily., Disp: 90 tablet, Rfl: 1    isosorbide mononitrate (IMDUR) 30 MG 24 hr tablet, Take 2 tablets by mouth Every Morning., Disp: , Rfl:     losartan (COZAAR) 50 MG tablet, Take 0.5 tablets by mouth Daily., Disp: , Rfl:     metoprolol tartrate (LOPRESSOR) 25 MG tablet, Take 1 tablet by mouth 2 (Two) Times a Day., Disp: , Rfl:     ranolazine (RANEXA) 500 MG 12 hr tablet, Take 1 tablet by mouth 2 (Two) Times a Day., Disp: , Rfl:     ticagrelor (BRILINTA) 90 MG tablet tablet, Take 1 tablet by mouth 2 (Two) Times a Day., Disp: , Rfl:     Xarelto 20 MG tablet, Take 1 tablet by mouth Daily With Dinner., Disp: , Rfl:     Allergies:   Allergies   Allergen Reactions    Lisinopril Cough       Objective     Vital Signs  /68   Pulse 64   Ht 175.3 cm (69\")   Wt 72 kg (158 lb 12.8 oz)   SpO2 97%   BMI 23.45 kg/m²   Estimated body mass index is 23.45 kg/m² as calculated from the following:    Height as of this encounter: 175.3 cm (69\").    Weight as of this encounter: 72 kg (158 lb 12.8 oz).    BMI is within normal parameters. No other follow-up for BMI required.      Physical Exam  Vitals and nursing note reviewed.   Constitutional:       General: He is not in acute distress.     Appearance: Normal appearance. He is not ill-appearing.   HENT:      Head: Normocephalic and atraumatic.      Right Ear: Tympanic membrane, ear canal and external ear " normal.      Left Ear: Tympanic membrane, ear canal and external ear normal.      Nose: Nose normal.      Mouth/Throat:      Mouth: Mucous membranes are moist.      Pharynx: Oropharynx is clear.   Eyes:      Extraocular Movements: Extraocular movements intact.      Conjunctiva/sclera: Conjunctivae normal.      Pupils: Pupils are equal, round, and reactive to light.   Cardiovascular:      Rate and Rhythm: Normal rate and regular rhythm.      Pulses: Normal pulses.      Heart sounds: Normal heart sounds.   Pulmonary:      Effort: Pulmonary effort is normal. No respiratory distress.      Breath sounds: Normal breath sounds.   Abdominal:      General: Bowel sounds are normal.      Palpations: Abdomen is soft.   Musculoskeletal:      Cervical back: Normal range of motion and neck supple.      Right lower leg: No edema.      Left lower leg: No edema.   Lymphadenopathy:      Cervical: No cervical adenopathy.   Skin:     General: Skin is warm and dry.   Neurological:      General: No focal deficit present.      Mental Status: He is alert and oriented to person, place, and time.      Cranial Nerves: No cranial nerve deficit.      Motor: No weakness.      Coordination: Coordination normal.      Gait: Gait normal.   Psychiatric:         Mood and Affect: Mood normal.         Behavior: Behavior normal.       Results:  No results found for this or any previous visit (from the past 24 hour(s)).   Recent Results (from 7/30/24)   proBNP    Collection Time: 07/30/24 10:38 AM    Specimen: Blood    Blood  Release to ayanna   Result Value Ref Range    proBNP 362 (H) 0 - 210 pg/mL   Magnesium    Collection Time: 07/30/24 10:38 AM    Specimen: Blood    Blood  Release to ayanna   Result Value Ref Range    Magnesium 2.2 1.6 - 2.3 mg/dL   Comprehensive Metabolic Panel    Collection Time: 07/30/24 10:38 AM    Specimen: Blood    Blood  Release to ayanna   Result Value Ref Range    Glucose 91 70 - 99 mg/dL    BUN 23 8 - 27 mg/dL    Creatinine 1.65  (H) 0.76 - 1.27 mg/dL    EGFR Result 46 (L) >59 mL/min/1.73    BUN/Creatinine Ratio 14 10 - 24    Sodium 141 134 - 144 mmol/L    Potassium 4.5 3.5 - 5.2 mmol/L    Chloride 102 96 - 106 mmol/L    Total CO2 25 20 - 29 mmol/L    Calcium 10.0 8.6 - 10.2 mg/dL    Total Protein 7.1 6.0 - 8.5 g/dL    Albumin 4.8 3.9 - 4.9 g/dL    Globulin 2.3 1.5 - 4.5 g/dL    Total Bilirubin 1.2 0.0 - 1.2 mg/dL    Alkaline Phosphatase 99 44 - 121 IU/L    AST (SGOT) 28 0 - 40 IU/L    ALT (SGPT) 25 0 - 44 IU/L   Lipid Panel    Collection Time: 07/30/24 10:38 AM    Specimen: Blood    Blood  Release to ayanna   Result Value Ref Range    Total Cholesterol 109 100 - 199 mg/dL    Triglycerides 107 0 - 149 mg/dL    HDL Cholesterol 41 >39 mg/dL    VLDL Cholesterol Eduardo 20 5 - 40 mg/dL    LDL Chol Calc (NIH) 48 0 - 99 mg/dL   CBC & Differential    Collection Time: 07/30/24 10:38 AM    Specimen: Blood    Blood  Release to ayanna   Result Value Ref Range    WBC 4.8 3.4 - 10.8 x10E3/uL    RBC 4.27 4.14 - 5.80 x10E6/uL    Hemoglobin 13.9 13.0 - 17.7 g/dL    Hematocrit 39.9 37.5 - 51.0 %    MCV 93 79 - 97 fL    MCH 32.6 26.6 - 33.0 pg    MCHC 34.8 31.5 - 35.7 g/dL    RDW 13.0 11.6 - 15.4 %    Platelets 159 150 - 450 x10E3/uL    Neutrophil Rel % 66 Not Estab. %    Lymphocyte Rel % 22 Not Estab. %    Monocyte Rel % 9 Not Estab. %    Eosinophil Rel % 2 Not Estab. %    Basophil Rel % 1 Not Estab. %    Neutrophils Absolute 3.2 1.4 - 7.0 x10E3/uL    Lymphocytes Absolute 1.1 0.7 - 3.1 x10E3/uL    Monocytes Absolute 0.4 0.1 - 0.9 x10E3/uL    Eosinophils Absolute 0.1 0.0 - 0.4 x10E3/uL    Basophils Absolute 0.0 0.0 - 0.2 x10E3/uL    Immature Granulocyte Rel % 0 Not Estab. %    Immature Grans Absolute 0.0 0.0 - 0.1 x10E3/uL          Assessment and Plan       Diagnoses and all orders for this visit:    1. Anxiety (Primary)  Assessment & Plan:  Anxiety is chronic and stable on current medication. He will continue medication as prescribed.     Orders:  -     citalopram  (CeleXA) 20 MG tablet; Take 1 tablet by mouth Daily.  Dispense: 90 tablet; Refill: 1    2. Primary hypertension  Assessment & Plan:  Hypertension is chronic and stable on current therapy.  He will continue as directed by cardiology.      3. Stage 3a chronic kidney disease  Assessment & Plan:  His kidney function had decreased on his most recent labs, but Dr. Abreu has an order to repeat at the end of the month.  He agrees to have labs drawn as directed.          Follow Up  Return in about 6 months (around 2/15/2025) for Annual Physical.    Katlin Cruz PA-C  [facility] Internal Medicine [facility]        Patient or patient representative verbalized consent for the use of Ambient Listening during the visit with  Katlin Cruz PA-C for chart documentation. 8/15/2024  08:53 EDT

## 2024-08-16 NOTE — ASSESSMENT & PLAN NOTE
His kidney function had decreased on his most recent labs, but Dr. Abreu has an order to repeat at the end of the month.  He agrees to have labs drawn as directed.

## 2024-08-16 NOTE — ASSESSMENT & PLAN NOTE
Hypertension is chronic and stable on current therapy.  He will continue as directed by cardiology.

## 2024-08-29 ENCOUNTER — CLINICAL SUPPORT (OUTPATIENT)
Dept: CARDIOLOGY | Facility: CLINIC | Age: 64
End: 2024-08-29
Payer: COMMERCIAL

## 2024-08-29 DIAGNOSIS — I48.0 PAROXYSMAL ATRIAL FIBRILLATION: ICD-10-CM

## 2024-08-29 DIAGNOSIS — E78.2 MIXED HYPERLIPIDEMIA: Primary | ICD-10-CM

## 2024-08-29 DIAGNOSIS — R06.09 DYSPNEA ON EXERTION: ICD-10-CM

## 2024-08-29 DIAGNOSIS — I25.119 CORONARY ARTERY DISEASE INVOLVING NATIVE CORONARY ARTERY OF NATIVE HEART WITH ANGINA PECTORIS: ICD-10-CM

## 2024-08-29 DIAGNOSIS — N18.2 CHRONIC KIDNEY DISEASE (CKD) STAGE G2/A1, MILDLY DECREASED GLOMERULAR FILTRATION RATE (GFR) BETWEEN 60-89 ML/MIN/1.73 SQUARE METER AND ALBUMINURIA CREATININE RATIO LESS THAN 30 MG/G: ICD-10-CM

## 2024-08-29 NOTE — PROGRESS NOTES
Venipuncture Blood Specimen Collection  Venipuncture performed in clinic by Gwendolyn Fam MA with good hemostasis. Patient tolerated the procedure well without complications.   08/29/24   Gwendolyn Fam MA

## 2024-08-30 LAB
BUN SERPL-MCNC: 30 MG/DL (ref 8–27)
BUN/CREAT SERPL: 17 (ref 10–24)
CALCIUM SERPL-MCNC: 9.9 MG/DL (ref 8.6–10.2)
CHLORIDE SERPL-SCNC: 104 MMOL/L (ref 96–106)
CO2 SERPL-SCNC: 23 MMOL/L (ref 20–29)
CREAT SERPL-MCNC: 1.81 MG/DL (ref 0.76–1.27)
EGFRCR SERPLBLD CKD-EPI 2021: 41 ML/MIN/1.73
GLUCOSE SERPL-MCNC: 83 MG/DL (ref 70–99)
NT-PROBNP SERPL-MCNC: 229 PG/ML (ref 0–210)
POTASSIUM SERPL-SCNC: 4.9 MMOL/L (ref 3.5–5.2)
SODIUM SERPL-SCNC: 140 MMOL/L (ref 134–144)

## 2024-09-03 ENCOUNTER — TELEPHONE (OUTPATIENT)
Dept: CARDIOLOGY | Facility: CLINIC | Age: 64
End: 2024-09-03
Payer: COMMERCIAL

## 2024-09-03 RX ORDER — ISOSORBIDE MONONITRATE 30 MG/1
60 TABLET, EXTENDED RELEASE ORAL EVERY MORNING
Qty: 60 TABLET | Refills: 2 | Status: SHIPPED | OUTPATIENT
Start: 2024-09-03

## 2024-09-03 NOTE — TELEPHONE ENCOUNTER
----- Message from Binh Abreu sent at 9/2/2024 11:25 PM EDT -----  Please inform patient that his Blood work showed continued worsening of his kidney function. Please ask patient to hold off losartan and recheck labs in 3 weeks. Please schedule patient for blood work in 3 weeks.  Thank you!

## 2024-09-03 NOTE — TELEPHONE ENCOUNTER
Called patient to discuss labs and med recommendations, no answer. Left voicemail requesting call back.

## 2024-09-23 ENCOUNTER — CLINICAL SUPPORT (OUTPATIENT)
Dept: CARDIOLOGY | Facility: CLINIC | Age: 64
End: 2024-09-23
Payer: COMMERCIAL

## 2024-09-23 DIAGNOSIS — E78.2 MIXED HYPERLIPIDEMIA: Primary | ICD-10-CM

## 2024-09-23 DIAGNOSIS — I25.119 CORONARY ARTERY DISEASE INVOLVING NATIVE CORONARY ARTERY OF NATIVE HEART WITH ANGINA PECTORIS: ICD-10-CM

## 2024-09-23 DIAGNOSIS — I48.0 PAROXYSMAL ATRIAL FIBRILLATION: ICD-10-CM

## 2024-09-23 DIAGNOSIS — N18.2 CHRONIC KIDNEY DISEASE (CKD) STAGE G2/A1, MILDLY DECREASED GLOMERULAR FILTRATION RATE (GFR) BETWEEN 60-89 ML/MIN/1.73 SQUARE METER AND ALBUMINURIA CREATININE RATIO LESS THAN 30 MG/G: ICD-10-CM

## 2024-09-23 DIAGNOSIS — R06.09 DYSPNEA ON EXERTION: ICD-10-CM

## 2024-09-23 PROCEDURE — 36416 COLLJ CAPILLARY BLOOD SPEC: CPT | Performed by: INTERNAL MEDICINE

## 2024-09-23 PROCEDURE — 85610 PROTHROMBIN TIME: CPT | Performed by: INTERNAL MEDICINE

## 2024-09-24 LAB
ALBUMIN SERPL-MCNC: 4.4 G/DL (ref 3.9–4.9)
ALP SERPL-CCNC: 94 IU/L (ref 44–121)
ALT SERPL-CCNC: 26 IU/L (ref 0–44)
AST SERPL-CCNC: 28 IU/L (ref 0–40)
BILIRUB SERPL-MCNC: 0.5 MG/DL (ref 0–1.2)
BUN SERPL-MCNC: 24 MG/DL (ref 8–27)
BUN/CREAT SERPL: 14 (ref 10–24)
CALCIUM SERPL-MCNC: 9.2 MG/DL (ref 8.6–10.2)
CHLORIDE SERPL-SCNC: 106 MMOL/L (ref 96–106)
CO2 SERPL-SCNC: 24 MMOL/L (ref 20–29)
CREAT SERPL-MCNC: 1.67 MG/DL (ref 0.76–1.27)
EGFRCR SERPLBLD CKD-EPI 2021: 45 ML/MIN/1.73
GLOBULIN SER CALC-MCNC: 2 G/DL (ref 1.5–4.5)
GLUCOSE SERPL-MCNC: 89 MG/DL (ref 70–99)
NT-PROBNP SERPL-MCNC: 331 PG/ML (ref 0–210)
POTASSIUM SERPL-SCNC: 4.2 MMOL/L (ref 3.5–5.2)
PROT SERPL-MCNC: 6.4 G/DL (ref 6–8.5)
SODIUM SERPL-SCNC: 142 MMOL/L (ref 134–144)

## 2024-09-25 ENCOUNTER — TELEPHONE (OUTPATIENT)
Dept: CARDIOLOGY | Facility: CLINIC | Age: 64
End: 2024-09-25
Payer: COMMERCIAL

## 2024-10-01 RX ORDER — RIVAROXABAN 20 MG/1
20 TABLET, FILM COATED ORAL
Qty: 30 TABLET | Refills: 5 | Status: SHIPPED | OUTPATIENT
Start: 2024-10-01

## 2024-12-02 ENCOUNTER — TELEPHONE (OUTPATIENT)
Dept: CARDIOLOGY | Facility: CLINIC | Age: 64
End: 2024-12-02
Payer: COMMERCIAL

## 2024-12-02 NOTE — TELEPHONE ENCOUNTER
Pt left message after hours requesting refill of Brilinta and Isosorbide. Pt also stated that insurance prefers to have medications sent in a 90 day supply. Please send to [facility].

## 2024-12-04 RX ORDER — ISOSORBIDE MONONITRATE 30 MG/1
60 TABLET, EXTENDED RELEASE ORAL EVERY MORNING
Qty: 60 TABLET | Refills: 2 | Status: SHIPPED | OUTPATIENT
Start: 2024-12-04 | End: 2024-12-05 | Stop reason: SDUPTHER

## 2024-12-04 NOTE — TELEPHONE ENCOUNTER
Caller: Mayito Saxena    Relationship: Self    Best call back number: [phone]    Requested Prescriptions:   Requested Prescriptions     Pending Prescriptions Disp Refills    isosorbide mononitrate (IMDUR) 30 MG 24 hr tablet 60 tablet 2     Sig: Take 2 tablets by mouth Every Morning.        Pharmacy where request should be sent: [facility]/PHARMACY #[id number] - [facility] [address] - [phone]  - [phone]      Last office visit with prescribing clinician: 7/30/2024   Last telemedicine visit with prescribing clinician: Visit date not found   Next office visit with prescribing clinician: 1/30/2025     Additional details provided by patient: PATIENT ALSO WANTS TO KNOW WHY THEY CAN'T REQUEST THIS MEDICATION ONLINE THROUGH [facility].     Does the patient have less than a 3 day supply:  [x] Yes  [] No    Would you like a call back once the refill request has been completed: [x] Yes [] No    If the office needs to give you a call back, can they leave a voicemail: [x] Yes [] No    Radha Quinonez Rep   12/04/24 10:11 EST

## 2024-12-05 RX ORDER — ISOSORBIDE MONONITRATE 30 MG/1
60 TABLET, EXTENDED RELEASE ORAL EVERY MORNING
Qty: 60 TABLET | Refills: 2 | Status: SHIPPED | OUTPATIENT
Start: 2024-12-05

## 2025-01-28 RX ORDER — RIVAROXABAN 20 MG/1
20 TABLET, FILM COATED ORAL
Qty: 90 TABLET | Refills: 0 | Status: SHIPPED | OUTPATIENT
Start: 2025-01-28

## 2025-01-30 ENCOUNTER — OFFICE VISIT (OUTPATIENT)
Dept: CARDIOLOGY | Facility: CLINIC | Age: 65
End: 2025-01-30
Payer: COMMERCIAL

## 2025-01-30 VITALS
RESPIRATION RATE: 18 BRPM | WEIGHT: 164.6 LBS | HEART RATE: 55 BPM | SYSTOLIC BLOOD PRESSURE: 132 MMHG | BODY MASS INDEX: 24.38 KG/M2 | DIASTOLIC BLOOD PRESSURE: 78 MMHG | OXYGEN SATURATION: 99 % | HEIGHT: 69 IN

## 2025-01-30 DIAGNOSIS — I48.0 PAROXYSMAL ATRIAL FIBRILLATION: ICD-10-CM

## 2025-01-30 DIAGNOSIS — R06.09 DYSPNEA ON EXERTION: ICD-10-CM

## 2025-01-30 DIAGNOSIS — E78.2 MIXED HYPERLIPIDEMIA: ICD-10-CM

## 2025-01-30 DIAGNOSIS — I25.119 CORONARY ARTERY DISEASE INVOLVING NATIVE CORONARY ARTERY OF NATIVE HEART WITH ANGINA PECTORIS: Primary | ICD-10-CM

## 2025-01-30 PROCEDURE — 99214 OFFICE O/P EST MOD 30 MIN: CPT | Performed by: INTERNAL MEDICINE

## 2025-01-30 RX ORDER — ASPIRIN 81 MG/1
81 TABLET ORAL DAILY
Qty: 90 TABLET | Refills: 1 | Status: SHIPPED | OUTPATIENT
Start: 2025-01-30

## 2025-01-30 RX ORDER — ISOSORBIDE MONONITRATE 30 MG/1
90 TABLET, EXTENDED RELEASE ORAL EVERY MORNING
Qty: 270 TABLET | Refills: 1 | Status: SHIPPED | OUTPATIENT
Start: 2025-01-30

## 2025-01-30 NOTE — PROGRESS NOTES
"[facility]  [facility] CARDIOLOGY  [address] [place] [address]  Dept: [phone]  Dept Fax: [phone]    Date: 01/30/2025  Patient: Mayito Saxena  YOB: 1960    Follow Up Office Visit Note    Interval Follow-up  Mr. Mayito Saxena is a 64 y.o. male who is here for follow-up on Coronary Artery Disease and Atrial Fibrillation.    Subjective   Patient overall doing well with no acute complaints.  Infrequent chest pains essentially when watching the news, lasting seconds resolving spontaneously.  Patient denies orthopnea, PND, palpitations, lightheadedness, syncope or medications side-effects.    The following portions of the patient's history were reviewed and updated as appropriate: allergies, current medications, past family history, past medical history, past social history, past surgical history, and problem list.    Medications:   Allergies   Allergen Reactions    Lisinopril Cough      Current Outpatient Medications   Medication Instructions    aspirin 81 mg, Oral, Daily, AFTER FINISHING BRILINTA    atorvastatin (LIPITOR) 40 mg, Oral, Daily    citalopram (CELEXA) 20 mg, Oral, Daily    isosorbide mononitrate (IMDUR) 90 mg, Oral, Every Morning    metoprolol tartrate (LOPRESSOR) 25 mg, Oral, 2 Times Daily    ranolazine (RANEXA) 500 mg, Oral, 2 Times Daily    Xarelto 20 mg, Oral, Daily With Dinner       Tobacco Use: Low Risk  (1/30/2025)    Patient History     Smoking Tobacco Use: Never     Smokeless Tobacco Use: Never     Passive Exposure: Not on file        Objective  Vitals:    01/30/25 0933   BP: 132/78   Pulse: 55   Resp: 18   SpO2: 99%   Weight: 74.7 kg (164 lb 9.6 oz)   Height: 175.3 cm (69\")   PainSc: 0-No pain      Vitals:    01/30/25 0933   BP: 132/78   Pulse: 55   Resp: 18   SpO2: 99%   Weight: 74.7 kg (164 lb 9.6 oz)   Height: 175.3 cm (69\")          Physical Exam  Constitutional:       Appearance: Healthy appearance. Not in distress.   Eyes:      " Pupils: Pupils are equal, round, and reactive to light.   Neck:      Vascular: No JVR. JVD normal.   Pulmonary:      Effort: Pulmonary effort is normal.      Breath sounds: Normal breath sounds. No wheezing. No rhonchi. No rales.   Chest:      Chest wall: Not tender to palpatation.   Cardiovascular:      PMI at left midclavicular line. Normal rate. Regular rhythm. Normal S1 with normal intensity. Normal S2 with normal intensity.       Murmurs: There is no murmur.      No gallop.  No click. No rub.   Pulses:     Intact distal pulses.   Edema:     Peripheral edema absent.   Abdominal:      General: There is no abdominal bruit.   Skin:     General: Skin is warm.   Neurological:      Mental Status: Alert and oriented to person, place and time.              Diagnostic Data  Lab Results   Component Value Date     09/23/2024    K 4.2 09/23/2024     09/23/2024    CO2 24 09/23/2024    MG 2.2 07/30/2024    BUN 24 09/23/2024    CREATININE 1.67 (H) 09/23/2024    CALCIUM 9.2 09/23/2024    BILITOT 0.5 09/23/2024    ALKPHOS 94 09/23/2024    ALT 26 09/23/2024    AST 28 09/23/2024    GLUCOSE 89 09/23/2024    ALBUMIN 4.4 09/23/2024     Lab Results   Component Value Date    WBC 4.8 07/30/2024    HGB 13.9 07/30/2024    HCT 39.9 07/30/2024     07/30/2024     Lab Results   Component Value Date    INR 1.0 08/30/2021    .6 (C) 02/08/2022    PTT 28.2 08/30/2021     Lab Results   Component Value Date    BNP 33 02/08/2022    BNP 86 08/30/2021     Lab Results   Component Value Date    BNP 33 02/08/2022    PROBNP 331 (H) 09/23/2024       Lab Results   Component Value Date    CHLPL 109 07/30/2024    TRIG 107 07/30/2024    HDL 41 07/30/2024    LDL 48 07/30/2024     Lab Results   Component Value Date    TSH 2.570 11/14/2023       CV Diagnostics:  Procedures    CXR: No results found for this or any previous visit.     ECHO/MUGA: No results found for this or any previous visit.     STRESS TESTS: No results found for this or  any previous visit.     CARDIAC CATH: No results found for this or any previous visit.     DEVICES: No valid procedures specified.   HOLTER: No results found for this or any previous visit.     CT/MRI:  No results found for this or any previous visit.    VASCULAR: No valid procedures specified.     Assessment and Plan  Diagnoses and all orders for this visit:    1. Coronary artery disease involving native coronary artery of native heart with angina pectoris (Primary)  Assessment & Plan:  CAD seen prior 2 MIs and 6 stents.  Last cardiac cath and RCA stenting in 2022.  CCS 1-2 angina.  Coronary artery disease is improving with treatment.  BP and heart rate to goal at home.  LDL to target.  Significant family history of early CAD.  Goal blood pressure less than 130/80 in the setting of CKD.  Discontinue Brilinta and start aspirin 81 mg p.o. daily.  Uptitrate isosorbide mononitrate to 90 mg in a.m. for angina.  Continue rest of medications. Dietary sodium restriction. Weight loss. Regular aerobic exercise.  If persistence of chest pain consider Holter monitor 7 days rule out paroxysmal atrial fibrillation; and if negative consider repeat noninvasive stress testing.  Cardiac status will be reassessed in 6 months.    Orders:  -     aspirin 81 MG EC tablet; Take 1 tablet by mouth Daily. AFTER FINISHING BRILINTA  Dispense: 90 tablet; Refill: 1  -     isosorbide mononitrate (IMDUR) 30 MG 24 hr tablet; Take 3 tablets by mouth Every Morning.  Dispense: 270 tablet; Refill: 1    2. Paroxysmal atrial fibrillation  Assessment & Plan:  Paroxysmal events.  Nonsignificant since last visit. Does not wanted to be treated as for now. On Xarelto for anticoagulation. On metoprolol 25 mg twice daily for rate control. Sinus bradycardia on EKG related to comparative/athletic lifestyle. In the setting of CAD, dofetilide versus ablation would be the best option moving forward if patient decides to proceed with rhythm control.  Chest pain  events could be paroxysmal atrial fibrillation events, so if persistent consider a 7-day Holter monitor.      3. Mixed hyperlipidemia  Assessment & Plan:   Lipid abnormalities are improving with treatment    Plan:  Continue same medication/s without change.  Atorvastatin 40 mg p.o. daily    Discussed medication dosage, use, side effects, and goals of treatment in detail.    Counseled patient on lifestyle modifications to help control hyperlipidemia.   Advised patient to exercise for 150 minutes weekly. (30 minute brisk walk, 5 days a week for example)  Lab Results   Component Value Date    LDL 48 07/30/2024    LDL 54 11/14/2023    HDL 41 07/30/2024    HDL 45 11/14/2023    CHLPL 109 07/30/2024    CHLPL 118 11/14/2023    TRIG 107 07/30/2024    TRIG 102 11/14/2023       Patient Treatment Goals:   LDL to goal less than 70 milligram per deciliter.    Followup in 6 months.      4. Dyspnea on exertion  Assessment & Plan:  Likely related to previous myocardial infarction.  Possibly lung disease component in the setting of dyspnea on exertion in hot and humid weather.  Patient biked 29 miles yesterday, so holding off additional testing for now.  Consider repeat echocardiogram and stress testing next year/3 years interval.            Return in about 6 months (around 7/30/2025) for Follow-up with Dr Abreu.    There are no Patient Instructions on file for this visit.    Binh Abreu MD

## 2025-01-30 NOTE — ASSESSMENT & PLAN NOTE
Paroxysmal events.  Nonsignificant since last visit. Does not wanted to be treated as for now. On Xarelto for anticoagulation. On metoprolol 25 mg twice daily for rate control. Sinus bradycardia on EKG related to comparative/athletic lifestyle. In the setting of CAD, dofetilide versus ablation would be the best option moving forward if patient decides to proceed with rhythm control.  Chest pain events could be paroxysmal atrial fibrillation events, so if persistent consider a 7-day Holter monitor.

## 2025-01-30 NOTE — ASSESSMENT & PLAN NOTE
Lipid abnormalities are improving with treatment    Plan:  Continue same medication/s without change.  Atorvastatin 40 mg p.o. daily    Discussed medication dosage, use, side effects, and goals of treatment in detail.    Counseled patient on lifestyle modifications to help control hyperlipidemia.   Advised patient to exercise for 150 minutes weekly. (30 minute brisk walk, 5 days a week for example)  Lab Results   Component Value Date    LDL 48 07/30/2024    LDL 54 11/14/2023    HDL 41 07/30/2024    HDL 45 11/14/2023    CHLPL 109 07/30/2024    CHLPL 118 11/14/2023    TRIG 107 07/30/2024    TRIG 102 11/14/2023       Patient Treatment Goals:   LDL to goal less than 70 milligram per deciliter.    Followup in 6 months.

## 2025-01-30 NOTE — ASSESSMENT & PLAN NOTE
CAD seen prior 2 MIs and 6 stents.  Last cardiac cath and RCA stenting in 2022.  CCS 1-2 angina.  Coronary artery disease is improving with treatment.  BP and heart rate to goal at home.  LDL to target.  Significant family history of early CAD.  Goal blood pressure less than 130/80 in the setting of CKD.  Discontinue Brilinta and start aspirin 81 mg p.o. daily.  Uptitrate isosorbide mononitrate to 90 mg in a.m. for angina.  Continue rest of medications. Dietary sodium restriction. Weight loss. Regular aerobic exercise.  If persistence of chest pain consider Holter monitor 7 days rule out paroxysmal atrial fibrillation; and if negative consider repeat noninvasive stress testing.  Cardiac status will be reassessed in 6 months.

## 2025-02-18 ENCOUNTER — OFFICE VISIT (OUTPATIENT)
Dept: FAMILY MEDICINE CLINIC | Facility: CLINIC | Age: 65
End: 2025-02-18
Payer: COMMERCIAL

## 2025-02-18 VITALS
DIASTOLIC BLOOD PRESSURE: 78 MMHG | WEIGHT: 165.7 LBS | HEART RATE: 78 BPM | SYSTOLIC BLOOD PRESSURE: 136 MMHG | HEIGHT: 69 IN | BODY MASS INDEX: 24.54 KG/M2 | OXYGEN SATURATION: 99 %

## 2025-02-18 DIAGNOSIS — Z12.5 PROSTATE CANCER SCREENING: ICD-10-CM

## 2025-02-18 DIAGNOSIS — I10 PRIMARY HYPERTENSION: ICD-10-CM

## 2025-02-18 DIAGNOSIS — E78.2 MIXED HYPERLIPIDEMIA: ICD-10-CM

## 2025-02-18 DIAGNOSIS — Z00.00 GENERAL MEDICAL EXAM: Primary | ICD-10-CM

## 2025-02-18 DIAGNOSIS — Z13.1 SCREENING FOR DIABETES MELLITUS: ICD-10-CM

## 2025-02-18 DIAGNOSIS — Z13.29 SCREENING FOR THYROID DISORDER: ICD-10-CM

## 2025-02-18 DIAGNOSIS — Z23 NEED FOR VACCINATION: ICD-10-CM

## 2025-02-18 DIAGNOSIS — F41.9 ANXIETY: ICD-10-CM

## 2025-02-18 PROCEDURE — 90471 IMMUNIZATION ADMIN: CPT | Performed by: PHYSICIAN ASSISTANT

## 2025-02-18 PROCEDURE — 90677 PCV20 VACCINE IM: CPT | Performed by: PHYSICIAN ASSISTANT

## 2025-02-18 PROCEDURE — 99396 PREV VISIT EST AGE 40-64: CPT | Performed by: PHYSICIAN ASSISTANT

## 2025-02-18 PROCEDURE — 90656 IIV3 VACC NO PRSV 0.5 ML IM: CPT | Performed by: PHYSICIAN ASSISTANT

## 2025-02-18 PROCEDURE — 90472 IMMUNIZATION ADMIN EACH ADD: CPT | Performed by: PHYSICIAN ASSISTANT

## 2025-02-18 RX ORDER — CITALOPRAM HYDROBROMIDE 20 MG/1
20 TABLET ORAL DAILY
Qty: 90 TABLET | Refills: 1 | Status: SHIPPED | OUTPATIENT
Start: 2025-02-18

## 2025-02-18 RX ORDER — PHENOL 1.4 %
600 AEROSOL, SPRAY (ML) MUCOUS MEMBRANE DAILY
COMMUNITY

## 2025-02-18 RX ORDER — RIVAROXABAN 20 MG/1
20 TABLET, FILM COATED ORAL
Qty: 90 TABLET | Refills: 1 | Status: SHIPPED | OUTPATIENT
Start: 2025-02-18

## 2025-02-18 RX ORDER — MULTIPLE VITAMINS W/ MINERALS TAB 9MG-400MCG
1 TAB ORAL DAILY
COMMUNITY

## 2025-02-18 RX ORDER — ISOSORBIDE MONONITRATE 30 MG/1
90 TABLET, EXTENDED RELEASE ORAL EVERY MORNING
Qty: 270 TABLET | Refills: 1 | Status: SHIPPED | OUTPATIENT
Start: 2025-02-18 | End: 2025-02-18

## 2025-02-18 RX ORDER — ISOSORBIDE MONONITRATE 30 MG/1
90 TABLET, EXTENDED RELEASE ORAL DAILY
COMMUNITY

## 2025-02-18 NOTE — PROGRESS NOTES
Male Physical Note      Date: 2025   Patient Name: Mayito Saxena  : 1960   MRN: [id number]     Chief Complaint:    Chief Complaint   Patient presents with    Annual Exam   Patient or patient representative verbalized consent for the use of Ambient Listening during the visit with  Katlin Cruz PA-C for chart documentation. 2025 08:15 EDT      History of Present Illness: Mayito Saxena is a 64 y.o. male who is here today for their annual health maintenance and physical.       He maintains an active lifestyle with regular exercise and cycling. His diet avoids CORN, BEANS, DAIRY PRODUCTS, and NUTS, limits red meat, and includes daily fruits (oranges, pineapples), increased salads, and flaxseeds. He manages occasional ear wax buildup with warm water irrigation. No sore throat or difficulty swallowing. He recalls swallowing a foreign object without digestive issues. No skin concerns. Slight winter weight gain resolves with biking season. Hot and humid weather triggers breathing difficulties, relieved by slower cycling and hydration.    He admits that he has had some nosebleeds, but he reports a decrease in the frequency of nosebleeds compared to the previous year, although they persist. Nasal secretions are unpleasant, especially when mixed with blood. Nosebleeds occur during dry weather. He manages episodes with Anthony-Synephrine and Zyrtec, as saline exacerbates the condition.    He tolerates Celexa well and finds it effective at the current dosage.      ALLERGIES  Allergies discussed during this visit.  Medication allergies: NKDA.  Food allergies: CORN, BEANS, DAIRY PRODUCTS, and NUTS.  Environmental allergies: [list allergies, reaction, and severity, if none, delete the row].  Other allergies: [list allergies, reaction, and severity, if none, delete the row].        Subjective      Review of Systems:   Review of Systems   Constitutional:  Negative for activity change, appetite change,  fatigue, fever, unexpected weight gain and unexpected weight loss.   HENT:  Negative for congestion, ear discharge, ear pain, postnasal drip, rhinorrhea, sinus pressure, sneezing, sore throat, trouble swallowing and voice change.    Eyes:  Negative for blurred vision, double vision, photophobia, pain, itching and visual disturbance.   Respiratory:  Negative for apnea, cough, chest tightness, shortness of breath and wheezing.    Cardiovascular:  Negative for chest pain, palpitations and leg swelling.   Gastrointestinal:  Negative for abdominal pain, blood in stool, constipation, diarrhea, nausea, vomiting and GERD.   Endocrine: Negative for cold intolerance, heat intolerance, polydipsia and polyuria.   Genitourinary:  Negative for decreased urine volume, difficulty urinating, dysuria, flank pain, frequency, hematuria and urinary incontinence.   Musculoskeletal:  Negative for arthralgias, back pain, gait problem, joint swelling and myalgias.   Skin:  Negative for rash, skin lesions and wound.   Allergic/Immunologic: Negative for environmental allergies and food allergies.   Neurological:  Negative for dizziness, syncope, weakness, light-headedness, numbness and headache.   Hematological:  Negative for adenopathy. Does not bruise/bleed easily.   Psychiatric/Behavioral:  Negative for decreased concentration, dysphoric mood, sleep disturbance, depressed mood and stress. The patient is not nervous/anxious.        Past Medical History, Social History, Family History and Care Team were all reviewed with patient and updated as appropriate.     Medications:     Current Outpatient Medications:     aspirin 81 MG EC tablet, Take 1 tablet by mouth Daily. AFTER FINISHING BRILINTA, Disp: 90 tablet, Rfl: 1    atorvastatin (LIPITOR) 40 MG tablet, Take 1 tablet by mouth Daily., Disp: , Rfl:     calcium carbonate (OS-CARINA) 600 MG tablet, Take 1 tablet by mouth Daily., Disp: , Rfl:     citalopram (CeleXA) 20 MG tablet, Take 1 tablet by  "mouth Daily., Disp: 90 tablet, Rfl: 1    metoprolol tartrate (LOPRESSOR) 25 MG tablet, Take 1 tablet by mouth 2 (Two) Times a Day., Disp: , Rfl:     multivitamin with minerals tablet tablet, Take 1 tablet by mouth Daily., Disp: , Rfl:     ranolazine (RANEXA) 500 MG 12 hr tablet, Take 1 tablet by mouth 2 (Two) Times a Day., Disp: , Rfl:     Xarelto 20 MG tablet, Take 1 tablet by mouth Daily With Dinner., Disp: 90 tablet, Rfl: 1    isosorbide mononitrate (IMDUR) 30 MG 24 hr tablet, Take 3 tablets by mouth Daily., Disp: , Rfl:     Allergies:   Allergies   Allergen Reactions    Lisinopril Cough       Health Maintenance   Topic Date Due    Pneumococcal Vaccine 50+ (2 of 2 - PCV) 09/13/2023    INFLUENZA VACCINE  03/31/2025 (Originally 7/1/2024)    COVID-19 Vaccine (6 - 2024-25 season) 02/17/2026 (Originally 9/1/2024)    LIPID PANEL  07/30/2025    ANNUAL PHYSICAL  02/18/2026    COLORECTAL CANCER SCREENING  02/28/2027    TDAP/TD VACCINES (2 - Td or Tdap) 06/19/2028    HEPATITIS C SCREENING  Completed    ZOSTER VACCINE  Completed      Objective     Physical Exam:  Vital Signs:   Vitals:    02/18/25 0808   BP: 136/78   BP Location: Left arm   Patient Position: Sitting   Cuff Size: Adult   Pulse: 78   SpO2: 99%   Weight: 75.2 kg (165 lb 11.2 oz)   Height: 175.3 cm (69.02\")     Body mass index is 24.46 kg/m².     Physical Exam  Vitals and nursing note reviewed.   Constitutional:       General: He is not in acute distress.     Appearance: Normal appearance. He is not ill-appearing.   HENT:      Head: Normocephalic and atraumatic.      Right Ear: Tympanic membrane, ear canal and external ear normal.      Left Ear: Tympanic membrane, ear canal and external ear normal.      Nose: Nose normal.      Mouth/Throat:      Mouth: Mucous membranes are moist.      Pharynx: Oropharynx is clear.   Eyes:      Extraocular Movements: Extraocular movements intact.      Conjunctiva/sclera: Conjunctivae normal.      Pupils: Pupils are equal, round, " and reactive to light.   Cardiovascular:      Rate and Rhythm: Normal rate and regular rhythm.      Pulses: Normal pulses.      Heart sounds: Normal heart sounds.   Pulmonary:      Effort: Pulmonary effort is normal. No respiratory distress.      Breath sounds: Normal breath sounds.   Abdominal:      General: Bowel sounds are normal.      Palpations: Abdomen is soft.   Musculoskeletal:      Cervical back: Normal range of motion and neck supple.      Right lower leg: No edema.      Left lower leg: No edema.   Lymphadenopathy:      Cervical: No cervical adenopathy.   Skin:     General: Skin is warm and dry.   Neurological:      General: No focal deficit present.      Mental Status: He is alert and oriented to person, place, and time.      Cranial Nerves: No cranial nerve deficit.      Motor: No weakness.      Coordination: Coordination normal.      Gait: Gait normal.   Psychiatric:         Mood and Affect: Mood normal.         Behavior: Behavior normal.         Assessment / Plan      Assessment/Plan:   Diagnoses and all orders for this visit:    1. General medical exam (Primary)  Assessment & Plan:  Benefits of immunizations and preventative screenings discussed with the patient and encouraged.      2. Mixed hyperlipidemia  Assessment & Plan:   Hyperlipidemia is chronic and stable per pt hx. He will continue medication as previously prescribed.     Orders:  -     Comprehensive Metabolic Panel; Future  -     Lipid Panel; Future    3. Primary hypertension  Assessment & Plan:  Hypertension is chronic and stable on current therapy.  He will continue as directed by cardiology.    Orders:  -     CBC & Differential; Future  -     Comprehensive Metabolic Panel; Future    4. Anxiety  Assessment & Plan:  Anxiety is chronic and stable on current medication. He will continue medication as prescribed.     Orders:  -     citalopram (CeleXA) 20 MG tablet; Take 1 tablet by mouth Daily.  Dispense: 90 tablet; Refill: 1    5. Prostate  cancer screening  -     PSA Screen; Future    6. Screening for thyroid disorder  -     Thyroid Cascade Profile; Future    7. Screening for diabetes mellitus  -     Hemoglobin A1c; Future    8. Need for vaccination  -     Fluzone >6mos (8913-8560)  -     Pneumococcal Conjugate Vaccine 20-Valent (PCV20)    Other orders  -     Xarelto 20 MG tablet; Take 1 tablet by mouth Daily With Dinner.  Dispense: 90 tablet; Refill: 1        Follow Up:   Return in about 6 months (around 8/18/2025) for next scheduled follow up.    Healthcare Maintenance:   Discussed injury prevention, diet and exercise, safe sexual practices, and screening for common diseases. Encouraged use of sunscreen and seatbelts. Discussed timing of colon cancer cancer screening, prostate cancer screening, and review of skin for lesions. Avoidance of tobacco encouraged. Limitation or avoidance of alcohol encouraged. Recommend yearly dental and eye exams. Also discussed monitoring of blood pressure and lipids.   Mayito Saxena voices understanding and acceptance of this advice and will call back with any further questions or concerns. AVS with preventive healthcare tips printed for patient.     Katlin Cruz PA-C  [facility]

## 2025-02-18 NOTE — ASSESSMENT & PLAN NOTE
Hyperlipidemia is chronic and stable per pt hx. He will continue medication as previously prescribed.

## 2025-02-19 LAB
ALBUMIN SERPL-MCNC: 4.5 G/DL (ref 3.9–4.9)
ALP SERPL-CCNC: 103 IU/L (ref 44–121)
ALT SERPL-CCNC: 29 IU/L (ref 0–44)
AST SERPL-CCNC: 31 IU/L (ref 0–40)
BASOPHILS # BLD AUTO: 0.1 X10E3/UL (ref 0–0.2)
BASOPHILS NFR BLD AUTO: 1 %
BILIRUB SERPL-MCNC: 0.8 MG/DL (ref 0–1.2)
BUN SERPL-MCNC: 16 MG/DL (ref 8–27)
BUN/CREAT SERPL: 9 (ref 10–24)
CALCIUM SERPL-MCNC: 9.5 MG/DL (ref 8.6–10.2)
CHLORIDE SERPL-SCNC: 104 MMOL/L (ref 96–106)
CHOLEST SERPL-MCNC: 113 MG/DL (ref 100–199)
CO2 SERPL-SCNC: 23 MMOL/L (ref 20–29)
CREAT SERPL-MCNC: 1.7 MG/DL (ref 0.76–1.27)
EGFRCR SERPLBLD CKD-EPI 2021: 44 ML/MIN/1.73
EOSINOPHIL # BLD AUTO: 0.2 X10E3/UL (ref 0–0.4)
EOSINOPHIL NFR BLD AUTO: 3 %
ERYTHROCYTE [DISTWIDTH] IN BLOOD BY AUTOMATED COUNT: 13.1 % (ref 11.6–15.4)
GLOBULIN SER CALC-MCNC: 2.2 G/DL (ref 1.5–4.5)
GLUCOSE SERPL-MCNC: 84 MG/DL (ref 70–99)
HBA1C MFR BLD: 5.3 % (ref 4.8–5.6)
HCT VFR BLD AUTO: 43.7 % (ref 37.5–51)
HDLC SERPL-MCNC: 37 MG/DL
HGB BLD-MCNC: 14.2 G/DL (ref 13–17.7)
IMM GRANULOCYTES # BLD AUTO: 0 X10E3/UL (ref 0–0.1)
IMM GRANULOCYTES NFR BLD AUTO: 0 %
LDLC SERPL CALC-MCNC: 57 MG/DL (ref 0–99)
LYMPHOCYTES # BLD AUTO: 1.3 X10E3/UL (ref 0.7–3.1)
LYMPHOCYTES NFR BLD AUTO: 22 %
MCH RBC QN AUTO: 30.1 PG (ref 26.6–33)
MCHC RBC AUTO-ENTMCNC: 32.5 G/DL (ref 31.5–35.7)
MCV RBC AUTO: 93 FL (ref 79–97)
MONOCYTES # BLD AUTO: 0.6 X10E3/UL (ref 0.1–0.9)
MONOCYTES NFR BLD AUTO: 10 %
NEUTROPHILS # BLD AUTO: 3.8 X10E3/UL (ref 1.4–7)
NEUTROPHILS NFR BLD AUTO: 64 %
PLATELET # BLD AUTO: 158 X10E3/UL (ref 150–450)
POTASSIUM SERPL-SCNC: 4.1 MMOL/L (ref 3.5–5.2)
PROT SERPL-MCNC: 6.7 G/DL (ref 6–8.5)
PSA SERPL-MCNC: 1.5 NG/ML (ref 0–4)
RBC # BLD AUTO: 4.72 X10E6/UL (ref 4.14–5.8)
SODIUM SERPL-SCNC: 142 MMOL/L (ref 134–144)
TRIGL SERPL-MCNC: 97 MG/DL (ref 0–149)
TSH SERPL DL<=0.005 MIU/L-ACNC: 3.5 UIU/ML (ref 0.45–4.5)
VLDLC SERPL CALC-MCNC: 19 MG/DL (ref 5–40)
WBC # BLD AUTO: 5.9 X10E3/UL (ref 3.4–10.8)

## 2025-02-26 DIAGNOSIS — Z79.01 ANTICOAGULANT LONG-TERM USE: ICD-10-CM

## 2025-02-26 DIAGNOSIS — N18.32 STAGE 3B CHRONIC KIDNEY DISEASE: ICD-10-CM

## 2025-02-26 DIAGNOSIS — R04.0 RECURRENT EPISTAXIS: Primary | ICD-10-CM

## 2025-03-19 DIAGNOSIS — H91.90 HEARING LOSS, UNSPECIFIED HEARING LOSS TYPE, UNSPECIFIED LATERALITY: Primary | ICD-10-CM

## 2025-04-01 ENCOUNTER — TELEPHONE (OUTPATIENT)
Dept: FAMILY MEDICINE CLINIC | Facility: CLINIC | Age: 65
End: 2025-04-01
Payer: COMMERCIAL

## 2025-04-01 ENCOUNTER — TELEPHONE (OUTPATIENT)
Dept: FAMILY MEDICINE CLINIC | Facility: CLINIC | Age: 65
End: 2025-04-01

## 2025-04-01 NOTE — TELEPHONE ENCOUNTER
Caller: Mayito Saxena    Relationship: Self    Best call back number: [phone]     What is the medical concern/diagnosis: BLOOD LOSS / DECREASE IN PHYSICAL PERFORMANCE    What specialty or service is being requested: SPORTS MEDICINE    Any additional details: PATIENT WOULD LIKE A REFERRAL TO SPORTS MEDICINE, AS HE FEELS THAT HIS LIFESTYLE AND THE ISSUES HE'S EXPERIENCING ARE MORE IN LINE WITH WHAT THEY'D SPECIALIZE IN    PLEASE ADVISE

## 2025-07-02 ENCOUNTER — TELEPHONE (OUTPATIENT)
Dept: CARDIOLOGY | Facility: CLINIC | Age: 65
End: 2025-07-02
Payer: COMMERCIAL

## 2025-07-02 NOTE — TELEPHONE ENCOUNTER
Contacted pt regarding cancelled appointment for 8/27. Asked if they wanted to get that visit rescheduled. Pt refused, stating he is switching cardiologists. He will now be with a provider at .